# Patient Record
(demographics unavailable — no encounter records)

---

## 2025-02-13 NOTE — ASSESSMENT
[FreeTextEntry1] : Lung nodule: Urged CT chest to help rule out growing tumor. He will schedule.   cerumen impaction: Recommended debrox daily and follow-up with ENT if symptoms persist.  HCM: Discussed [facility] for colon ca screening.

## 2025-02-13 NOTE — PHYSICAL EXAM
[No Acute Distress] : no acute distress [Normal Sclera/Conjunctiva] : normal sclera/conjunctiva [PERRL] : pupils equal round and reactive to light [EOMI] : extraocular movements intact [No Lymphadenopathy] : no lymphadenopathy [Supple] : supple [No Respiratory Distress] : no respiratory distress  [No Accessory Muscle Use] : no accessory muscle use [Clear to Auscultation] : lungs were clear to auscultation bilaterally [de-identified] : b/l cerumen impaction

## 2025-02-13 NOTE — REVIEW OF SYSTEMS
[Fever] : no fever [Chills] : no chills [Night Sweats] : no night sweats [Earache] : no earache [Nasal Discharge] : no nasal discharge [Sore Throat] : no sore throat [Chest Pain] : no chest pain [Palpitations] : no palpitations [Lower Ext Edema] : no lower extremity edema [Shortness Of Breath] : no shortness of breath [Wheezing] : no wheezing [Cough] : no cough [Dyspnea on Exertion] : not dyspnea on exertion

## 2025-02-13 NOTE — HISTORY OF PRESENT ILLNESS
[de-identified] : 47M presents for follow-up of lung nodule and for left ear pressure. Denies fever, chills, pain, tinnitus. Has tried debrox which hasn't helped.

## 2025-02-13 NOTE — HISTORY OF PRESENT ILLNESS
[de-identified] : 47M presents for follow-up of lung nodule and for left ear pressure. Denies fever, chills, pain, tinnitus. Has tried debrox which hasn't helped.

## 2025-02-13 NOTE — PHYSICAL EXAM
[No Acute Distress] : no acute distress [Normal Sclera/Conjunctiva] : normal sclera/conjunctiva [PERRL] : pupils equal round and reactive to light [EOMI] : extraocular movements intact [No Lymphadenopathy] : no lymphadenopathy [Supple] : supple [No Respiratory Distress] : no respiratory distress  [No Accessory Muscle Use] : no accessory muscle use [Clear to Auscultation] : lungs were clear to auscultation bilaterally [de-identified] : b/l cerumen impaction